# Patient Record
Sex: MALE | Race: BLACK OR AFRICAN AMERICAN
[De-identification: names, ages, dates, MRNs, and addresses within clinical notes are randomized per-mention and may not be internally consistent; named-entity substitution may affect disease eponyms.]

---

## 2021-07-21 ENCOUNTER — HOSPITAL ENCOUNTER (EMERGENCY)
Dept: HOSPITAL 82 - ED | Age: 41
LOS: 1 days | DRG: 101 | End: 2021-07-22
Payer: COMMERCIAL

## 2021-07-21 VITALS — WEIGHT: 240.3 LBS | BODY MASS INDEX: 34.4 KG/M2 | HEIGHT: 70 IN

## 2021-07-21 DIAGNOSIS — G40.909: Primary | ICD-10-CM

## 2021-07-21 DIAGNOSIS — Z91.128: ICD-10-CM

## 2021-07-21 DIAGNOSIS — T42.76XA: ICD-10-CM

## 2021-07-21 LAB
ALBUMIN SERPL-MCNC: 4.8 G/DL (ref 3.2–5)
ALP SERPL-CCNC: 79 U/L (ref 38–126)
ANION GAP SERPL CALCULATED.3IONS-SCNC: 21 MMOL/L
AST SERPL-CCNC: 30 U/L (ref 17–59)
BASOPHILS NFR BLD AUTO: 0 % (ref 0–3)
BUN SERPL-MCNC: 19 MG/DL (ref 9–20)
BUN/CREAT SERPL: 12
CHLORIDE SERPL-SCNC: 101 MMOL/L (ref 95–108)
CO2 SERPL-SCNC: 19 MMOL/L (ref 22–30)
CREAT SERPL-MCNC: 1.6 MG/DL (ref 0.7–1.3)
EOSINOPHIL NFR BLD AUTO: 0 % (ref 0–8)
ERYTHROCYTE [DISTWIDTH] IN BLOOD BY AUTOMATED COUNT: 11.9 % (ref 11.5–15.5)
HCT VFR BLD AUTO: 47.7 % (ref 39–50)
HGB BLD-MCNC: 15.6 G/DL (ref 14–18)
IMM GRANULOCYTES NFR BLD: 0.3 % (ref 0–5)
LYMPHOCYTES NFR BLD: 30 % (ref 15–41)
MCH RBC QN AUTO: 31.8 PG  CALC (ref 26–32)
MCHC RBC AUTO-ENTMCNC: 32.7 G/DL CAL (ref 32–36)
MCV RBC AUTO: 97.1 FL  CALC (ref 80–100)
MONOCYTES NFR BLD AUTO: 12 % (ref 2–13)
NEUTROPHILS # BLD AUTO: 4.38 THOU/UL (ref 1.82–7.42)
NEUTROPHILS NFR BLD AUTO: 57 % (ref 42–76)
PLATELET # BLD AUTO: 290 THOU/UL (ref 130–400)
POTASSIUM SERPL-SCNC: 3.6 MMOL/L (ref 3.5–5.1)
PROT SERPL-MCNC: 9.3 G/DL (ref 6.3–8.2)
RBC # BLD AUTO: 4.91 MILL/UL (ref 4.7–6.1)
SODIUM SERPL-SCNC: 137 MMOL/L (ref 137–146)

## 2021-07-22 VITALS — DIASTOLIC BLOOD PRESSURE: 85 MMHG | SYSTOLIC BLOOD PRESSURE: 130 MMHG

## 2022-04-19 ENCOUNTER — HOSPITAL ENCOUNTER (INPATIENT)
Dept: HOSPITAL 82 - ED | Age: 42
LOS: 3 days | Discharge: ANOTHER HOSPITAL | DRG: 101 | End: 2022-04-22
Attending: INTERNAL MEDICINE | Admitting: INTERNAL MEDICINE
Payer: COMMERCIAL

## 2022-04-19 VITALS — BODY MASS INDEX: 31.05 KG/M2 | WEIGHT: 221.79 LBS | HEIGHT: 71 IN

## 2022-04-19 VITALS — SYSTOLIC BLOOD PRESSURE: 125 MMHG | DIASTOLIC BLOOD PRESSURE: 69 MMHG

## 2022-04-19 VITALS — DIASTOLIC BLOOD PRESSURE: 83 MMHG | SYSTOLIC BLOOD PRESSURE: 135 MMHG

## 2022-04-19 DIAGNOSIS — Z20.822: ICD-10-CM

## 2022-04-19 DIAGNOSIS — G40.409: Primary | ICD-10-CM

## 2022-04-19 LAB
ALBUMIN SERPL-MCNC: 4.5 G/DL (ref 3.2–5)
ALP SERPL-CCNC: 77 U/L (ref 38–126)
ANION GAP SERPL CALCULATED.3IONS-SCNC: 14 MMOL/L
AST SERPL-CCNC: 29 U/L (ref 17–59)
BASOPHILS NFR BLD AUTO: 1 % (ref 0–3)
BILIRUB UR QL STRIP.AUTO: NEGATIVE
BUN SERPL-MCNC: 13 MG/DL (ref 9–20)
BUN/CREAT SERPL: 12
CHLORIDE SERPL-SCNC: 107 MMOL/L (ref 95–108)
CO2 SERPL-SCNC: 22 MMOL/L (ref 22–30)
COLOR UR AUTO: YELLOW
CREAT SERPL-MCNC: 1.1 MG/DL (ref 0.7–1.3)
EOSINOPHIL NFR BLD AUTO: 1 % (ref 0–8)
ERYTHROCYTE [DISTWIDTH] IN BLOOD BY AUTOMATED COUNT: 11.6 % (ref 11.5–15.5)
ETHANOL SERPL-MCNC: 0 MG/DL (ref 0–30)
GLUCOSE UR STRIP.AUTO-MCNC: NEGATIVE MG/DL
HCT VFR BLD AUTO: 42.9 % (ref 39–50)
HGB BLD-MCNC: 14.4 G/DL (ref 14–18)
HGB UR QL STRIP.AUTO: NEGATIVE
IMM GRANULOCYTES NFR BLD: 0.2 % (ref 0–5)
KETONES UR STRIP.AUTO-MCNC: 15 MG/DL
LEUKOCYTE ESTERASE UR QL STRIP.AUTO: NEGATIVE
LIPASE SERPL-CCNC: 34 U/L (ref 23–300)
LYMPHOCYTES NFR BLD: 32 % (ref 15–41)
MCH RBC QN AUTO: 31.8 PG  CALC (ref 26–32)
MCHC RBC AUTO-ENTMCNC: 33.6 G/DL CAL (ref 32–36)
MCV RBC AUTO: 94.7 FL  CALC (ref 80–100)
MONOCYTES NFR BLD AUTO: 12 % (ref 2–13)
NEUTROPHILS # BLD AUTO: 2.32 THOU/UL (ref 1.82–7.42)
NEUTROPHILS NFR BLD AUTO: 55 % (ref 42–76)
NITRITE UR QL STRIP.AUTO: NEGATIVE
PH UR STRIP.AUTO: 6 [PH] (ref 4.5–8)
PLATELET # BLD AUTO: 284 THOU/UL (ref 130–400)
POTASSIUM SERPL-SCNC: 3.8 MMOL/L (ref 3.5–5.1)
PROT SERPL-MCNC: 8.6 G/DL (ref 6.3–8.2)
PROT UR QL STRIP.AUTO: NEGATIVE MG/DL
RBC # BLD AUTO: 4.53 MILL/UL (ref 4.7–6.1)
SODIUM SERPL-SCNC: 140 MMOL/L (ref 137–146)
SP GR UR STRIP.AUTO: >=1.03
UROBILINOGEN UR QL STRIP.AUTO: 0.2 E.U./DL

## 2022-04-19 PROCEDURE — A9579 GAD-BASE MR CONTRAST NOS,1ML: HCPCS

## 2022-04-19 NOTE — NUR
PATIENT RESTING IN BED. ALERT AND ORIENTED. ABLE TO MAKE NEEDS KNOWN. TWO
GUARDS IN ROOM AT BEDSIDE. SHACKLES TO BILATERAL ANKLES PRESENT TO FOOT OF
BED. GOOD CIRCULATION PRESENT. ASSESSMENT COMPLETE. NO SIGNS OF DISTRESS
NOTED. NO COMPLAINTS OF PAIN. NO SIGNS OF SEIZURE ACTIVITY OBSERVED. BED RAILS
REMAIN PADDED FOR PATIENT SAFETY. CALL BELL IN REACH. BED IN LOW POSITION.

## 2022-04-19 NOTE — NUR
RECEIVE REPORT FROM ER NURSE SAVANA. PATIENT ALERT AND ORIENTED X3. NO DEFICIT
AT THIS TIME. PATIENT IS EDUCATES ABOUT MEDICATIONS, ADMISSION AND NURSING
PLAN. PATIENT REFER UNDERSTAND.

## 2022-04-19 NOTE — NUR
PT IN STRETCHER WITH SEIZURE PADS ON RAILS. IN NO DISTRESS AT THIS TIME. PT IS
DROWSY BUT AROUSABLE. VITALS STABLE.

## 2022-04-19 NOTE — NUR
FEMALE GUARD STATED, ''I FORGOT TO MENTION TO THE OTHER NURSE AT CHANGE OF
SHIFT THAT HE WAS HARD TO WAKE UP AND HIS EYES LOOKED ROLLED''. WHEN I
ASSESSED PATIENT ON MY ASSESSMENT BEGINNING OF MY SHIFT, I OBSERVED NO SIGNS
OF SEIZURE ACTIVITY AND PATIENT HAD NO COMPLAINTS. WILL RELAY TO DAY SHIFT
NURSE AS SHE WILL BE BACK IN AM.

## 2022-04-19 NOTE — NUR
SHACKLED CHECKED AS WELL TO PATIENTS ANKLES AND ADJUSTED TO KEEP SKIN FREE
FROM SORES AND IRRITATION.

## 2022-04-20 VITALS — DIASTOLIC BLOOD PRESSURE: 88 MMHG | SYSTOLIC BLOOD PRESSURE: 142 MMHG

## 2022-04-20 VITALS — DIASTOLIC BLOOD PRESSURE: 56 MMHG | SYSTOLIC BLOOD PRESSURE: 103 MMHG

## 2022-04-20 VITALS — SYSTOLIC BLOOD PRESSURE: 140 MMHG | DIASTOLIC BLOOD PRESSURE: 79 MMHG

## 2022-04-20 VITALS — SYSTOLIC BLOOD PRESSURE: 108 MMHG | DIASTOLIC BLOOD PRESSURE: 70 MMHG

## 2022-04-20 VITALS — SYSTOLIC BLOOD PRESSURE: 118 MMHG | DIASTOLIC BLOOD PRESSURE: 75 MMHG

## 2022-04-20 VITALS — DIASTOLIC BLOOD PRESSURE: 59 MMHG | SYSTOLIC BLOOD PRESSURE: 111 MMHG

## 2022-04-20 VITALS — DIASTOLIC BLOOD PRESSURE: 95 MMHG | SYSTOLIC BLOOD PRESSURE: 130 MMHG

## 2022-04-20 VITALS — SYSTOLIC BLOOD PRESSURE: 114 MMHG | DIASTOLIC BLOOD PRESSURE: 72 MMHG

## 2022-04-20 VITALS — SYSTOLIC BLOOD PRESSURE: 118 MMHG | DIASTOLIC BLOOD PRESSURE: 74 MMHG

## 2022-04-20 VITALS — SYSTOLIC BLOOD PRESSURE: 123 MMHG | DIASTOLIC BLOOD PRESSURE: 70 MMHG

## 2022-04-20 VITALS — DIASTOLIC BLOOD PRESSURE: 56 MMHG | SYSTOLIC BLOOD PRESSURE: 92 MMHG

## 2022-04-20 VITALS — DIASTOLIC BLOOD PRESSURE: 60 MMHG | SYSTOLIC BLOOD PRESSURE: 102 MMHG

## 2022-04-20 VITALS — SYSTOLIC BLOOD PRESSURE: 113 MMHG | DIASTOLIC BLOOD PRESSURE: 75 MMHG

## 2022-04-20 VITALS — SYSTOLIC BLOOD PRESSURE: 120 MMHG | DIASTOLIC BLOOD PRESSURE: 76 MMHG

## 2022-04-20 VITALS — DIASTOLIC BLOOD PRESSURE: 72 MMHG | SYSTOLIC BLOOD PRESSURE: 123 MMHG

## 2022-04-20 VITALS — SYSTOLIC BLOOD PRESSURE: 112 MMHG | DIASTOLIC BLOOD PRESSURE: 79 MMHG

## 2022-04-20 VITALS — SYSTOLIC BLOOD PRESSURE: 114 MMHG | DIASTOLIC BLOOD PRESSURE: 81 MMHG

## 2022-04-20 VITALS — DIASTOLIC BLOOD PRESSURE: 57 MMHG | SYSTOLIC BLOOD PRESSURE: 94 MMHG

## 2022-04-20 VITALS — SYSTOLIC BLOOD PRESSURE: 117 MMHG | DIASTOLIC BLOOD PRESSURE: 85 MMHG

## 2022-04-20 VITALS — SYSTOLIC BLOOD PRESSURE: 120 MMHG | DIASTOLIC BLOOD PRESSURE: 77 MMHG

## 2022-04-20 VITALS — SYSTOLIC BLOOD PRESSURE: 109 MMHG | DIASTOLIC BLOOD PRESSURE: 76 MMHG

## 2022-04-20 VITALS — SYSTOLIC BLOOD PRESSURE: 115 MMHG | DIASTOLIC BLOOD PRESSURE: 73 MMHG

## 2022-04-20 VITALS — DIASTOLIC BLOOD PRESSURE: 57 MMHG | SYSTOLIC BLOOD PRESSURE: 98 MMHG

## 2022-04-20 VITALS — DIASTOLIC BLOOD PRESSURE: 85 MMHG | SYSTOLIC BLOOD PRESSURE: 118 MMHG

## 2022-04-20 VITALS — SYSTOLIC BLOOD PRESSURE: 132 MMHG | DIASTOLIC BLOOD PRESSURE: 81 MMHG

## 2022-04-20 VITALS — SYSTOLIC BLOOD PRESSURE: 99 MMHG | DIASTOLIC BLOOD PRESSURE: 61 MMHG

## 2022-04-20 VITALS — SYSTOLIC BLOOD PRESSURE: 125 MMHG | DIASTOLIC BLOOD PRESSURE: 74 MMHG

## 2022-04-20 VITALS — DIASTOLIC BLOOD PRESSURE: 81 MMHG | SYSTOLIC BLOOD PRESSURE: 118 MMHG

## 2022-04-20 VITALS — SYSTOLIC BLOOD PRESSURE: 130 MMHG | DIASTOLIC BLOOD PRESSURE: 89 MMHG

## 2022-04-20 VITALS — SYSTOLIC BLOOD PRESSURE: 141 MMHG | DIASTOLIC BLOOD PRESSURE: 77 MMHG

## 2022-04-20 VITALS — DIASTOLIC BLOOD PRESSURE: 61 MMHG | SYSTOLIC BLOOD PRESSURE: 111 MMHG

## 2022-04-20 VITALS — DIASTOLIC BLOOD PRESSURE: 52 MMHG | SYSTOLIC BLOOD PRESSURE: 93 MMHG

## 2022-04-20 VITALS — SYSTOLIC BLOOD PRESSURE: 95 MMHG | DIASTOLIC BLOOD PRESSURE: 58 MMHG

## 2022-04-20 VITALS — SYSTOLIC BLOOD PRESSURE: 122 MMHG | DIASTOLIC BLOOD PRESSURE: 78 MMHG

## 2022-04-20 VITALS — DIASTOLIC BLOOD PRESSURE: 79 MMHG | SYSTOLIC BLOOD PRESSURE: 124 MMHG

## 2022-04-20 VITALS — SYSTOLIC BLOOD PRESSURE: 149 MMHG | DIASTOLIC BLOOD PRESSURE: 96 MMHG

## 2022-04-20 VITALS — DIASTOLIC BLOOD PRESSURE: 59 MMHG | SYSTOLIC BLOOD PRESSURE: 118 MMHG

## 2022-04-20 VITALS — SYSTOLIC BLOOD PRESSURE: 119 MMHG | DIASTOLIC BLOOD PRESSURE: 77 MMHG

## 2022-04-20 VITALS — DIASTOLIC BLOOD PRESSURE: 80 MMHG | SYSTOLIC BLOOD PRESSURE: 110 MMHG

## 2022-04-20 VITALS — SYSTOLIC BLOOD PRESSURE: 119 MMHG | DIASTOLIC BLOOD PRESSURE: 85 MMHG

## 2022-04-20 VITALS — SYSTOLIC BLOOD PRESSURE: 111 MMHG | DIASTOLIC BLOOD PRESSURE: 76 MMHG

## 2022-04-20 VITALS — SYSTOLIC BLOOD PRESSURE: 113 MMHG | DIASTOLIC BLOOD PRESSURE: 73 MMHG

## 2022-04-20 VITALS — SYSTOLIC BLOOD PRESSURE: 131 MMHG | DIASTOLIC BLOOD PRESSURE: 82 MMHG

## 2022-04-20 VITALS — DIASTOLIC BLOOD PRESSURE: 74 MMHG | SYSTOLIC BLOOD PRESSURE: 108 MMHG

## 2022-04-20 VITALS — SYSTOLIC BLOOD PRESSURE: 121 MMHG | DIASTOLIC BLOOD PRESSURE: 78 MMHG

## 2022-04-20 VITALS — SYSTOLIC BLOOD PRESSURE: 127 MMHG | DIASTOLIC BLOOD PRESSURE: 74 MMHG

## 2022-04-20 VITALS — DIASTOLIC BLOOD PRESSURE: 75 MMHG | SYSTOLIC BLOOD PRESSURE: 114 MMHG

## 2022-04-20 VITALS — DIASTOLIC BLOOD PRESSURE: 77 MMHG | SYSTOLIC BLOOD PRESSURE: 112 MMHG

## 2022-04-20 VITALS — SYSTOLIC BLOOD PRESSURE: 120 MMHG | DIASTOLIC BLOOD PRESSURE: 85 MMHG

## 2022-04-20 VITALS — SYSTOLIC BLOOD PRESSURE: 103 MMHG | DIASTOLIC BLOOD PRESSURE: 57 MMHG

## 2022-04-20 VITALS — DIASTOLIC BLOOD PRESSURE: 73 MMHG | SYSTOLIC BLOOD PRESSURE: 106 MMHG

## 2022-04-20 VITALS — DIASTOLIC BLOOD PRESSURE: 56 MMHG | SYSTOLIC BLOOD PRESSURE: 112 MMHG

## 2022-04-20 VITALS — SYSTOLIC BLOOD PRESSURE: 118 MMHG | DIASTOLIC BLOOD PRESSURE: 76 MMHG

## 2022-04-20 VITALS — SYSTOLIC BLOOD PRESSURE: 125 MMHG | DIASTOLIC BLOOD PRESSURE: 84 MMHG

## 2022-04-20 VITALS — SYSTOLIC BLOOD PRESSURE: 142 MMHG | DIASTOLIC BLOOD PRESSURE: 87 MMHG

## 2022-04-20 VITALS — SYSTOLIC BLOOD PRESSURE: 122 MMHG | DIASTOLIC BLOOD PRESSURE: 71 MMHG

## 2022-04-20 VITALS — DIASTOLIC BLOOD PRESSURE: 73 MMHG | SYSTOLIC BLOOD PRESSURE: 115 MMHG

## 2022-04-20 VITALS — DIASTOLIC BLOOD PRESSURE: 62 MMHG | SYSTOLIC BLOOD PRESSURE: 100 MMHG

## 2022-04-20 LAB
ALBUMIN SERPL-MCNC: 3.5 G/DL (ref 3.2–5)
ALP SERPL-CCNC: 70 U/L (ref 38–126)
ANION GAP SERPL CALCULATED.3IONS-SCNC: 11 MMOL/L
AST SERPL-CCNC: 20 U/L (ref 17–59)
BUN SERPL-MCNC: 13 MG/DL (ref 9–20)
BUN/CREAT SERPL: 12
CHLORIDE SERPL-SCNC: 108 MMOL/L (ref 95–108)
CO2 SERPL-SCNC: 25 MMOL/L (ref 22–30)
CREAT SERPL-MCNC: 1.1 MG/DL (ref 0.7–1.3)
ERYTHROCYTE [DISTWIDTH] IN BLOOD BY AUTOMATED COUNT: 11.7 % (ref 11.5–15.5)
HCT VFR BLD AUTO: 40.8 % (ref 39–50)
HGB BLD-MCNC: 13.5 G/DL (ref 14–18)
MAGNESIUM SERPL-MCNC: 2 MG/DL (ref 1.6–2.3)
MCH RBC QN AUTO: 32.1 PG  CALC (ref 26–32)
MCHC RBC AUTO-ENTMCNC: 33.1 G/DL CAL (ref 32–36)
MCV RBC AUTO: 96.9 FL  CALC (ref 80–100)
PLATELET # BLD AUTO: 271 THOU/UL (ref 130–400)
POTASSIUM SERPL-SCNC: 4.1 MMOL/L (ref 3.5–5.1)
PROT SERPL-MCNC: 6.7 G/DL (ref 6.3–8.2)
RBC # BLD AUTO: 4.21 MILL/UL (ref 4.7–6.1)
SODIUM SERPL-SCNC: 139 MMOL/L (ref 137–146)

## 2022-04-20 NOTE — NUR
PT BROUGHT TO ICU BED 1 AFTER RAPID RESPONSE TEAM CALLED TO ROOM WHERE PT WAS
HAVING A SEIZURE.  ACCORDING TO MED SURG NURSE, PT HAS A HX OF SEIZURES AND
HAD BEEN HAVING THEM ALL DAY YESTERDAY.  SEIZURE PADS IN PLACE , FLUIDS
INFUSING WITH KEPPRA DRIP,  PT HAD BEEN GIVEN 4 MG OF ATIVAN IV BEFORE
TRANSFERRED TO ICU.  UPON ARRIVAL TO ICU WITH 2 GUARDS, PTS VITAL SIGNS
STABLE, NO ACTIVE SEIZURES AT THIS TIME,

## 2022-04-20 NOTE — NUR
NO BAG OF IV FLUIDS HUNG. NO COMPLAINTS VOICED BY PATIENT. NO DISTRESS NOTED.
TWO GUARDS REMAIN AT BEDSIDE IN ROOM. SHACKLED CHECKED TO BILATERAL ANKLE.
GOOD CICRCULATION TO BILATERAL ANKLES. CALL LIGHT REMAINS IN REACH.

## 2022-04-20 NOTE — NUR
mri of head recommended and will follow up as consult tomorrow. pt ate approx
100% of dinner tray,  has not had any seizure activity since arrival on icu
unit,  alert/oriented x3

## 2022-04-20 NOTE — NUR
CNA CHANGED PATIENTS SOCKS TO PUT LONGER SOCKS ON PATIENT TO HELP PROTECT SKIN
FROM SHACKLES TO HELP REDUCE SKIN FROM BREAKING DOWN. CIRCULATION APPEARS
ADEQUATE. PATIENT DID COMPLAIN OF A HEADACHE. PRN TYLENOL PROVIDED. NO SIGNS
OF SEIZURE ACTIVITY NOTED. CALL BELL IN REACH. TWO GUARDS REMAIN IN ROOM WITH
PATIENT. SHACKLES REMAIN TO BILATERAL ANKLES.

## 2022-04-20 NOTE — NUR
DILANTIN INFUSING PER ORDER.  PT WAKING UP WHILE TALKING TO GUARDS. RESPONDING
WELL FOR RIGHT AFTER SEDATION.  VITAL SIGNS STABLE.  SPOKE WITH NURSE AT
FACILITY PT COMES FROM AND GAVE HER AN UPDATE OF WHAT WAS HAPPENING WITH PT.
ADVISED WILL NOTIFY OF ANY CHANGES.

## 2022-04-20 NOTE — NUR
2 NEW GUARDS JUST ARRIVED TO SWITCH OUT WITH THE 2 CURRENT GUARDS THAT ARE IN
THE ROOM CURRENTLY WITH THE PATIENT.

## 2022-04-20 NOTE — NUR
PT STILL REMAINS IN DROWSY SEDATED STATES FROM 4 MG OF ATIVAN. WILL WAIT PER
TELENEUROLOGIST REPORT FOR SEVERAL HOURS TIL ATIVAN BEGINS WEARING OFF TO
OBSERVE IF HE RETURNS TO NORMAL STATES BEFORE SEIZURE.  PT WILL WITHDRAW FROM
PAIN, WILL OPEN EYES WITH VERBAL.  GUARDS REMAIN AT BEDSIDE, AND VITAL SIGNS
REMAIN STABLE

## 2022-04-20 NOTE — NUR
teleneurology on [place] cart 2,  pt states he has less sensation on lower left
leg since yesterday.  states doesnt think he remembers telling anyone that
since arrival

## 2022-04-20 NOTE — NUR
PATIENT RESTING IN BED. SHACKLES REMAIN TO BILATERAL ANKLES. GOOD CIRCULATION.
SKIN INTACT. NO COMPLAINTS OF PAIN. NO SEIZURE ACTIVITY NOTED. CALL LIGHT IN
REACH. TWO GUARDS REMAIN IN ROOM WITH PATIENT.

## 2022-04-20 NOTE — NUR
PATIENT RESTING IN BED. NO COMPLAINTS VOICED AT THIS TIME. PATIENT VERY
PLEASANT. NO SEIZURE ACTIVITY OBSERVED. GUARDS X2 REMAIN IN ROOM. SHACKLES
CHECKED TO BILATERAL ANKLES. SKIN CHECKED AS WELL. CIRCULATION GOOD.

## 2022-04-20 NOTE — NUR
PT AWAKE AND WATCHING TV, HAS URINATED APPROX 1200 OUT.  ALERT/ORIENTED, NO
SEIZURE ACTIVITY NOTED.  VITAL SIGNS STABLE.

## 2022-04-20 NOTE — NUR
NEW BAG OF IV FLUIDS HUNG. NO COMPLAINTS VOICED BY PATIENT. NO DISTRESS NOTED.
TWO GUARDS REMAIN AT BEDSIDE IN ROOM. SHACKLES CHECKED TO BILATERAL ANKLES.
GOOD CIRCULATION TO BILATERAL ANKLES. CALL LIGHT REMAINS IN REACH.

## 2022-04-20 NOTE — NUR
GUARD MET NURSE IN HALLWAY CLOSE TO PT'S ROOM AND INFORMED PT WAS HAVING
SEIZURE, ON PROMPT ASSESSMENT PT IN SUPINE POSITION IN BED WITH HEAD TURNED TO
LEFT SIDE, TONGUE HANGING OUT OF MOUTH AND EYES CLOSED.
HE DID NOT RESPOND TO VERBAL OR TACTILE STIMULI AND NURSE SENT STAFF TO CALL
RAPID RESPONSE STAT WHILE SHE STAYED WITH PT. TEAM ARRIVED IN SECONDS AND
BEGAN TREATMENT, PT WAS THEN TRANSFERRED TO ICU BED-1 AFTER HE STARTED TO
RESPOND TO TREATMENT. PT REMAINS IN ICU FOR CLOSER MONITORING.

## 2022-04-20 NOTE — NUR
ADVISED PT THAT WE WOULD GIVE HIM A MEAL TRAY AFTER HE HAD COMPLETELY WOKEN UP
SO WE KNOW THAT HE CAN SWALLOW THE FOOD WITHOUT CHOKING.  PT AWAKENS AND TALKS
, USING URINAL, THEN WILL FALL IMMEDIATELY BACK TO SLEEP.  VITAL SIGNS STABLE.
 GUARDS REMAIN AT BEDSIDE, WITH FOOT SHACKLED.

## 2022-04-20 NOTE — NUR
TELENEUROLOGIST DID CONSULT VIA CART 2,  SUGGESTED AN MRI AN EEG IF PT DOES
NOT RETURN TO NORMAL STATUS IN A FEW HOURS.

## 2022-04-21 VITALS — SYSTOLIC BLOOD PRESSURE: 88 MMHG | DIASTOLIC BLOOD PRESSURE: 57 MMHG

## 2022-04-21 VITALS — SYSTOLIC BLOOD PRESSURE: 118 MMHG | DIASTOLIC BLOOD PRESSURE: 72 MMHG

## 2022-04-21 VITALS — SYSTOLIC BLOOD PRESSURE: 105 MMHG | DIASTOLIC BLOOD PRESSURE: 64 MMHG

## 2022-04-21 VITALS — DIASTOLIC BLOOD PRESSURE: 48 MMHG | SYSTOLIC BLOOD PRESSURE: 103 MMHG

## 2022-04-21 VITALS — SYSTOLIC BLOOD PRESSURE: 118 MMHG | DIASTOLIC BLOOD PRESSURE: 67 MMHG

## 2022-04-21 VITALS — SYSTOLIC BLOOD PRESSURE: 105 MMHG | DIASTOLIC BLOOD PRESSURE: 60 MMHG

## 2022-04-21 VITALS — SYSTOLIC BLOOD PRESSURE: 102 MMHG | DIASTOLIC BLOOD PRESSURE: 57 MMHG

## 2022-04-21 VITALS — SYSTOLIC BLOOD PRESSURE: 117 MMHG | DIASTOLIC BLOOD PRESSURE: 83 MMHG

## 2022-04-21 VITALS — DIASTOLIC BLOOD PRESSURE: 60 MMHG | SYSTOLIC BLOOD PRESSURE: 129 MMHG

## 2022-04-21 VITALS — DIASTOLIC BLOOD PRESSURE: 71 MMHG | SYSTOLIC BLOOD PRESSURE: 120 MMHG

## 2022-04-21 VITALS — SYSTOLIC BLOOD PRESSURE: 102 MMHG | DIASTOLIC BLOOD PRESSURE: 62 MMHG

## 2022-04-21 VITALS — DIASTOLIC BLOOD PRESSURE: 70 MMHG | SYSTOLIC BLOOD PRESSURE: 106 MMHG

## 2022-04-21 VITALS — DIASTOLIC BLOOD PRESSURE: 60 MMHG | SYSTOLIC BLOOD PRESSURE: 111 MMHG

## 2022-04-21 VITALS — DIASTOLIC BLOOD PRESSURE: 58 MMHG | SYSTOLIC BLOOD PRESSURE: 129 MMHG

## 2022-04-21 VITALS — SYSTOLIC BLOOD PRESSURE: 87 MMHG | DIASTOLIC BLOOD PRESSURE: 67 MMHG

## 2022-04-21 VITALS — SYSTOLIC BLOOD PRESSURE: 103 MMHG | DIASTOLIC BLOOD PRESSURE: 63 MMHG

## 2022-04-21 VITALS — DIASTOLIC BLOOD PRESSURE: 69 MMHG | SYSTOLIC BLOOD PRESSURE: 118 MMHG

## 2022-04-21 VITALS — SYSTOLIC BLOOD PRESSURE: 117 MMHG | DIASTOLIC BLOOD PRESSURE: 71 MMHG

## 2022-04-21 VITALS — DIASTOLIC BLOOD PRESSURE: 61 MMHG | SYSTOLIC BLOOD PRESSURE: 108 MMHG

## 2022-04-21 VITALS — SYSTOLIC BLOOD PRESSURE: 99 MMHG | DIASTOLIC BLOOD PRESSURE: 52 MMHG

## 2022-04-21 VITALS — DIASTOLIC BLOOD PRESSURE: 47 MMHG | SYSTOLIC BLOOD PRESSURE: 114 MMHG

## 2022-04-21 VITALS — SYSTOLIC BLOOD PRESSURE: 109 MMHG | DIASTOLIC BLOOD PRESSURE: 58 MMHG

## 2022-04-21 VITALS — DIASTOLIC BLOOD PRESSURE: 73 MMHG | SYSTOLIC BLOOD PRESSURE: 114 MMHG

## 2022-04-21 VITALS — DIASTOLIC BLOOD PRESSURE: 49 MMHG | SYSTOLIC BLOOD PRESSURE: 91 MMHG

## 2022-04-21 VITALS — DIASTOLIC BLOOD PRESSURE: 71 MMHG | SYSTOLIC BLOOD PRESSURE: 109 MMHG

## 2022-04-21 VITALS — DIASTOLIC BLOOD PRESSURE: 76 MMHG | SYSTOLIC BLOOD PRESSURE: 115 MMHG

## 2022-04-21 VITALS — DIASTOLIC BLOOD PRESSURE: 74 MMHG | SYSTOLIC BLOOD PRESSURE: 111 MMHG

## 2022-04-21 VITALS — DIASTOLIC BLOOD PRESSURE: 77 MMHG | SYSTOLIC BLOOD PRESSURE: 128 MMHG

## 2022-04-21 VITALS — DIASTOLIC BLOOD PRESSURE: 74 MMHG | SYSTOLIC BLOOD PRESSURE: 119 MMHG

## 2022-04-21 VITALS — DIASTOLIC BLOOD PRESSURE: 67 MMHG | SYSTOLIC BLOOD PRESSURE: 111 MMHG

## 2022-04-21 VITALS — SYSTOLIC BLOOD PRESSURE: 130 MMHG | DIASTOLIC BLOOD PRESSURE: 74 MMHG

## 2022-04-21 VITALS — SYSTOLIC BLOOD PRESSURE: 132 MMHG | DIASTOLIC BLOOD PRESSURE: 75 MMHG

## 2022-04-21 VITALS — DIASTOLIC BLOOD PRESSURE: 74 MMHG | SYSTOLIC BLOOD PRESSURE: 121 MMHG

## 2022-04-21 VITALS — DIASTOLIC BLOOD PRESSURE: 72 MMHG | SYSTOLIC BLOOD PRESSURE: 121 MMHG

## 2022-04-21 VITALS — DIASTOLIC BLOOD PRESSURE: 68 MMHG | SYSTOLIC BLOOD PRESSURE: 113 MMHG

## 2022-04-21 VITALS — DIASTOLIC BLOOD PRESSURE: 70 MMHG | SYSTOLIC BLOOD PRESSURE: 112 MMHG

## 2022-04-21 VITALS — DIASTOLIC BLOOD PRESSURE: 84 MMHG | SYSTOLIC BLOOD PRESSURE: 117 MMHG

## 2022-04-21 VITALS — DIASTOLIC BLOOD PRESSURE: 51 MMHG | SYSTOLIC BLOOD PRESSURE: 98 MMHG

## 2022-04-21 VITALS — DIASTOLIC BLOOD PRESSURE: 70 MMHG | SYSTOLIC BLOOD PRESSURE: 115 MMHG

## 2022-04-21 VITALS — SYSTOLIC BLOOD PRESSURE: 108 MMHG | DIASTOLIC BLOOD PRESSURE: 78 MMHG

## 2022-04-21 VITALS — DIASTOLIC BLOOD PRESSURE: 57 MMHG | SYSTOLIC BLOOD PRESSURE: 76 MMHG

## 2022-04-21 VITALS — SYSTOLIC BLOOD PRESSURE: 122 MMHG | DIASTOLIC BLOOD PRESSURE: 72 MMHG

## 2022-04-21 VITALS — SYSTOLIC BLOOD PRESSURE: 88 MMHG | DIASTOLIC BLOOD PRESSURE: 55 MMHG

## 2022-04-21 VITALS — SYSTOLIC BLOOD PRESSURE: 114 MMHG | DIASTOLIC BLOOD PRESSURE: 71 MMHG

## 2022-04-21 VITALS — DIASTOLIC BLOOD PRESSURE: 87 MMHG | SYSTOLIC BLOOD PRESSURE: 124 MMHG

## 2022-04-21 VITALS — SYSTOLIC BLOOD PRESSURE: 109 MMHG | DIASTOLIC BLOOD PRESSURE: 51 MMHG

## 2022-04-21 VITALS — DIASTOLIC BLOOD PRESSURE: 60 MMHG | SYSTOLIC BLOOD PRESSURE: 95 MMHG

## 2022-04-21 VITALS — SYSTOLIC BLOOD PRESSURE: 116 MMHG | DIASTOLIC BLOOD PRESSURE: 75 MMHG

## 2022-04-21 VITALS — SYSTOLIC BLOOD PRESSURE: 91 MMHG | DIASTOLIC BLOOD PRESSURE: 48 MMHG

## 2022-04-21 VITALS — DIASTOLIC BLOOD PRESSURE: 81 MMHG | SYSTOLIC BLOOD PRESSURE: 122 MMHG

## 2022-04-21 VITALS — SYSTOLIC BLOOD PRESSURE: 130 MMHG | DIASTOLIC BLOOD PRESSURE: 73 MMHG

## 2022-04-21 VITALS — SYSTOLIC BLOOD PRESSURE: 138 MMHG | DIASTOLIC BLOOD PRESSURE: 90 MMHG

## 2022-04-21 LAB
ANION GAP SERPL CALCULATED.3IONS-SCNC: 11 MMOL/L
BUN SERPL-MCNC: 11 MG/DL (ref 9–20)
BUN/CREAT SERPL: 9
CHLORIDE SERPL-SCNC: 108 MMOL/L (ref 95–108)
CO2 SERPL-SCNC: 26 MMOL/L (ref 22–30)
CREAT SERPL-MCNC: 1.1 MG/DL (ref 0.7–1.3)
ERYTHROCYTE [DISTWIDTH] IN BLOOD BY AUTOMATED COUNT: 11.8 % (ref 11.5–15.5)
HCT VFR BLD AUTO: 39.7 % (ref 39–50)
HGB BLD-MCNC: 13.1 G/DL (ref 14–18)
MAGNESIUM SERPL-MCNC: 1.8 MG/DL (ref 1.6–2.3)
MCH RBC QN AUTO: 32.3 PG  CALC (ref 26–32)
MCHC RBC AUTO-ENTMCNC: 33 G/DL CAL (ref 32–36)
MCV RBC AUTO: 98 FL  CALC (ref 80–100)
PHENYTOIN (DILANTIN): 11 UG/ML (ref 10–20)
PLATELET # BLD AUTO: 255 THOU/UL (ref 130–400)
POTASSIUM SERPL-SCNC: 3.9 MMOL/L (ref 3.5–5.1)
RBC # BLD AUTO: 4.05 MILL/UL (ref 4.7–6.1)
SODIUM SERPL-SCNC: 141 MMOL/L (ref 137–146)

## 2022-04-21 NOTE — NUR
pt placed  in w/c in preparation for transfer to mri.  walked with steady gait
from bed to w/c, moniter placed beside pt in chair with blanket and mask in
place.

## 2022-04-21 NOTE — NUR
pt sitting up in bed eating lunch, no seizure activity at this time.  states
feels fine, maew, vital signs stable.  talking and laughing with staff.  nihs
score a 0 at this time.

## 2022-04-21 NOTE — NUR
MRI CALLED AND ASKED ABOUT ATIVAN STATUS.  WILL TRY AND TALK PT THRU BUT IS
HAVING A HARD TIME LAYING STILL

## 2022-04-21 NOTE — NUR
NO NEW SEIZURE ACTIVITY SINCE THIS AM EPISODE.  VITAL SIGNS STABLE.  GUARDS
REMAIN AT BEDSIDE. MAEW, CALL LIGHT WITHIN REACH.  NO COMPLAINTS AT THIS TIME

## 2022-04-21 NOTE — NUR
PT RECEIVED 532 ML OF JEVITY NUTRITION  VIA PEG TUBE. PRCODEURE WAS TOLERATED
WELL. PT IS RESTING QUIETLY POSITIONED ON RIGHT SIDE WITH PILLOWS ON AN AIR
MATRESS. MARIN IS SECURE AND  FLOWING TO GRAVITY. WILL CONTINUE TO MONITOR.

## 2022-04-21 NOTE — NUR
guard notified staff that pt was having a small seizure,  pt was shaking in
upper part of body with rigidity, not alert at this time.  ativan 1 mg given
per order.  within 1 min seizure has finished.  remains drowsy, no
incontinence, vital signs stable.

## 2022-04-21 NOTE — NUR
pt gave himself a bed bath, linens change,pt walked to bathroom with steady
gait  smiling and talking with staff  no complaits

## 2022-04-21 NOTE — NUR
waiting for neurologist to come on [place] cart 2 to speak with pt.  pt resting
quietly on bed, no complaints at this time, will continue to moniter and
reassess pt.

## 2022-04-21 NOTE — NUR
DR. DUMONT NOTIFIED OF RECOMMENDATIONS OF DR. WOMACK THE TELENEUROLOGIST.
CHANGE ORDER  OF KEPPRA TO 1500 MG BID,  CHANGE ORDER OF DILANTIN TID 
MG AT NIGHT TIME,  CHANGE ATIVAN 1 MG IV PRN FOR SEIZURE ONLY FROM ATIVAN TID.
DILANTIN LEVEL IN AM

## 2022-04-21 NOTE — NUR
PT REPORT RECEIVED FROM NIGHT SHIFT.  NO CHANGE THRU OUT THE NIGHT, VITAL
SIGNS STABLE,  NO SEIZURE ACTIVITY,  PT MAEW. GUARDS REMAIN AT BEDDSIDE.

## 2022-04-21 NOTE — NUR
pt eating late breakfast due to mri at this time.  no seizure activity today,
pt up to bathroom, walking on own.

## 2022-04-22 VITALS — SYSTOLIC BLOOD PRESSURE: 128 MMHG | DIASTOLIC BLOOD PRESSURE: 76 MMHG

## 2022-04-22 VITALS — DIASTOLIC BLOOD PRESSURE: 51 MMHG | SYSTOLIC BLOOD PRESSURE: 105 MMHG

## 2022-04-22 VITALS — DIASTOLIC BLOOD PRESSURE: 76 MMHG | SYSTOLIC BLOOD PRESSURE: 126 MMHG

## 2022-04-22 VITALS — DIASTOLIC BLOOD PRESSURE: 33 MMHG | SYSTOLIC BLOOD PRESSURE: 69 MMHG

## 2022-04-22 VITALS — DIASTOLIC BLOOD PRESSURE: 64 MMHG | SYSTOLIC BLOOD PRESSURE: 119 MMHG

## 2022-04-22 VITALS — SYSTOLIC BLOOD PRESSURE: 65 MMHG | DIASTOLIC BLOOD PRESSURE: 32 MMHG

## 2022-04-22 VITALS — DIASTOLIC BLOOD PRESSURE: 87 MMHG | SYSTOLIC BLOOD PRESSURE: 122 MMHG

## 2022-04-22 VITALS — SYSTOLIC BLOOD PRESSURE: 78 MMHG | DIASTOLIC BLOOD PRESSURE: 42 MMHG

## 2022-04-22 VITALS — SYSTOLIC BLOOD PRESSURE: 106 MMHG | DIASTOLIC BLOOD PRESSURE: 76 MMHG

## 2022-04-22 VITALS — SYSTOLIC BLOOD PRESSURE: 113 MMHG | DIASTOLIC BLOOD PRESSURE: 78 MMHG

## 2022-04-22 VITALS — SYSTOLIC BLOOD PRESSURE: 120 MMHG | DIASTOLIC BLOOD PRESSURE: 75 MMHG

## 2022-04-22 VITALS — DIASTOLIC BLOOD PRESSURE: 62 MMHG | SYSTOLIC BLOOD PRESSURE: 121 MMHG

## 2022-04-22 VITALS — DIASTOLIC BLOOD PRESSURE: 50 MMHG | SYSTOLIC BLOOD PRESSURE: 95 MMHG

## 2022-04-22 VITALS — DIASTOLIC BLOOD PRESSURE: 33 MMHG | SYSTOLIC BLOOD PRESSURE: 68 MMHG

## 2022-04-22 VITALS — SYSTOLIC BLOOD PRESSURE: 100 MMHG | DIASTOLIC BLOOD PRESSURE: 68 MMHG

## 2022-04-22 VITALS — SYSTOLIC BLOOD PRESSURE: 118 MMHG | DIASTOLIC BLOOD PRESSURE: 85 MMHG

## 2022-04-22 VITALS — SYSTOLIC BLOOD PRESSURE: 121 MMHG | DIASTOLIC BLOOD PRESSURE: 74 MMHG

## 2022-04-22 VITALS — DIASTOLIC BLOOD PRESSURE: 80 MMHG | SYSTOLIC BLOOD PRESSURE: 113 MMHG

## 2022-04-22 VITALS — SYSTOLIC BLOOD PRESSURE: 99 MMHG | DIASTOLIC BLOOD PRESSURE: 62 MMHG

## 2022-04-22 VITALS — DIASTOLIC BLOOD PRESSURE: 63 MMHG | SYSTOLIC BLOOD PRESSURE: 103 MMHG

## 2022-04-22 VITALS — SYSTOLIC BLOOD PRESSURE: 112 MMHG | DIASTOLIC BLOOD PRESSURE: 67 MMHG

## 2022-04-22 VITALS — SYSTOLIC BLOOD PRESSURE: 101 MMHG | DIASTOLIC BLOOD PRESSURE: 64 MMHG

## 2022-04-22 VITALS — DIASTOLIC BLOOD PRESSURE: 66 MMHG | SYSTOLIC BLOOD PRESSURE: 114 MMHG

## 2022-04-22 VITALS — SYSTOLIC BLOOD PRESSURE: 112 MMHG | DIASTOLIC BLOOD PRESSURE: 80 MMHG

## 2022-04-22 VITALS — DIASTOLIC BLOOD PRESSURE: 64 MMHG | SYSTOLIC BLOOD PRESSURE: 103 MMHG

## 2022-04-22 VITALS — DIASTOLIC BLOOD PRESSURE: 60 MMHG | SYSTOLIC BLOOD PRESSURE: 102 MMHG

## 2022-04-22 VITALS — DIASTOLIC BLOOD PRESSURE: 71 MMHG | SYSTOLIC BLOOD PRESSURE: 104 MMHG

## 2022-04-22 VITALS — DIASTOLIC BLOOD PRESSURE: 61 MMHG | SYSTOLIC BLOOD PRESSURE: 110 MMHG

## 2022-04-22 VITALS — DIASTOLIC BLOOD PRESSURE: 69 MMHG | SYSTOLIC BLOOD PRESSURE: 108 MMHG

## 2022-04-22 VITALS — DIASTOLIC BLOOD PRESSURE: 64 MMHG | SYSTOLIC BLOOD PRESSURE: 110 MMHG

## 2022-04-22 VITALS — SYSTOLIC BLOOD PRESSURE: 107 MMHG | DIASTOLIC BLOOD PRESSURE: 60 MMHG

## 2022-04-22 VITALS — DIASTOLIC BLOOD PRESSURE: 78 MMHG | SYSTOLIC BLOOD PRESSURE: 116 MMHG

## 2022-04-22 VITALS — SYSTOLIC BLOOD PRESSURE: 114 MMHG | DIASTOLIC BLOOD PRESSURE: 79 MMHG

## 2022-04-22 LAB
ANION GAP SERPL CALCULATED.3IONS-SCNC: 12 MMOL/L
BUN SERPL-MCNC: 10 MG/DL (ref 9–20)
BUN/CREAT SERPL: 8
CHLORIDE SERPL-SCNC: 107 MMOL/L (ref 95–108)
CO2 SERPL-SCNC: 26 MMOL/L (ref 22–30)
CREAT SERPL-MCNC: 1.2 MG/DL (ref 0.7–1.3)
ERYTHROCYTE [DISTWIDTH] IN BLOOD BY AUTOMATED COUNT: 11.8 % (ref 11.5–15.5)
HCT VFR BLD AUTO: 41 % (ref 39–50)
HGB BLD-MCNC: 13.5 G/DL (ref 14–18)
MAGNESIUM SERPL-MCNC: 1.9 MG/DL (ref 1.6–2.3)
MCH RBC QN AUTO: 32.4 PG  CALC (ref 26–32)
MCHC RBC AUTO-ENTMCNC: 32.9 G/DL CAL (ref 32–36)
MCV RBC AUTO: 98.3 FL  CALC (ref 80–100)
PLATELET # BLD AUTO: 260 THOU/UL (ref 130–400)
POTASSIUM SERPL-SCNC: 4 MMOL/L (ref 3.5–5.1)
RBC # BLD AUTO: 4.17 MILL/UL (ref 4.7–6.1)
SODIUM SERPL-SCNC: 141 MMOL/L (ref 137–146)

## 2022-04-22 NOTE — NUR
call placed to [facility] transfer center; information provided to Linda regarding
transfer; facesheet and covid results have been faxed; awaiting return call

## 2022-04-22 NOTE — NUR
awake in bed eating dinner; no apparent distress noted; pt offers no
complaints; iv intact; guards x2 at bedside; pt remains cuffed to bed; call
light within reach; will continue to monitor

## 2022-04-22 NOTE — NUR
pt awake in bed; no apparent distress noted; pt additional seizure activity
since this am; iv intact; guards x2 at bedside; sr on monitor; transfer
explained/consent obtained; DOC staff informed of ETA; will continue to
monitor

## 2022-04-22 NOTE — NUR
pt awake in bed conversing with [facility] staff; no apparent distress noted; iv
intact and patent; pt alert and oriented; does not recall having a seizure; pt
admits to having headaches, tasting neelam and feeling hot prior to seizures;
pt states hx of seizures since 14-15 years old; am meds explained and
administered; plan to transfer explained; will continue to monitor

## 2022-04-22 NOTE — NUR
call placed to [facility]; information provided; ETA 30
minutes

## 2022-04-22 NOTE — NUR
pt resting in bed with eyes closed; guards x2 at bedside; iv intact; sr on
monitor; call light within reach; will continue to monitor

## 2022-04-22 NOTE — NUR
[facility] transfer center called; spoke with Linda; pt accepted just awaiting bed
assignment

## 2022-04-22 NOTE — NUR
awake in bed; no apparent distress noted; pt offers no complaints; nutrigrain
bars and soda provided as per request; iv intact; sr on monitor; guards x2 at
bedside; call light within reach; will continue to monitor

## 2022-04-22 NOTE — NUR
pt resting in bed with eyes closed; no apparent distress noted; correctional
officers x2 at bedside; pt easily arousable; admits to headache rating 8/10;
no n/v noted; pt denies any vision problems; will medicate; resp even and
unlabored; lungs clear; skin color wnl; ra; hr reg; strong pulses; no edema
noted; sr on monitor; abd soft with bs present; no bm noted per writer; pt
admits to bm last night; no urine to inspect at this time; pt admits to
urinating without complication; #20 flushed and patent to lh; no redness or
edema noted at site; plan of care/am meds explained; pt cuffed bilat le; pt
requesting for "ultraound to check his kidneys "from the meds I'm on""; labs
reviewed with pt; pt informed ultrasound of kidneys can be performed on outpt
basis at the facility; no seizure activity noted; call light within reach;
will continue to monitor

## 2022-04-22 NOTE — NUR
call received from Linda at [facility] transfer center; bed assignment received;
pt to transfer to neuro unit [id number]; report to be called to transfer line;
accepting MD Dr OLIVIA Solis

## 2022-04-22 NOTE — NUR
pt noted with active seizure; mild tremors noted to lower extremities;
posturing to upper extremities; body rigid; airway patent; seizure last approx
35-40 seconds; no urinary incont noted; pt postictal; will contiune to monitor

## 2022-04-22 NOTE — NUR
Dr Escalera present at bedside to assess pt and discuss plan of care; pt remains
postictal at this time

## 2022-08-22 ENCOUNTER — HOSPITAL ENCOUNTER (EMERGENCY)
Dept: HOSPITAL 82 - ED | Age: 42
Discharge: ANOTHER HOSPITAL | DRG: 101 | End: 2022-08-22
Payer: COMMERCIAL

## 2022-08-22 VITALS — DIASTOLIC BLOOD PRESSURE: 85 MMHG | SYSTOLIC BLOOD PRESSURE: 125 MMHG

## 2022-08-22 VITALS — DIASTOLIC BLOOD PRESSURE: 75 MMHG | SYSTOLIC BLOOD PRESSURE: 105 MMHG

## 2022-08-22 VITALS — SYSTOLIC BLOOD PRESSURE: 120 MMHG | DIASTOLIC BLOOD PRESSURE: 77 MMHG

## 2022-08-22 VITALS — DIASTOLIC BLOOD PRESSURE: 78 MMHG | SYSTOLIC BLOOD PRESSURE: 112 MMHG

## 2022-08-22 VITALS — DIASTOLIC BLOOD PRESSURE: 93 MMHG | SYSTOLIC BLOOD PRESSURE: 122 MMHG

## 2022-08-22 VITALS — WEIGHT: 249.12 LBS | BODY MASS INDEX: 34.88 KG/M2 | HEIGHT: 71 IN

## 2022-08-22 VITALS — SYSTOLIC BLOOD PRESSURE: 119 MMHG | DIASTOLIC BLOOD PRESSURE: 85 MMHG

## 2022-08-22 VITALS — SYSTOLIC BLOOD PRESSURE: 126 MMHG | DIASTOLIC BLOOD PRESSURE: 88 MMHG

## 2022-08-22 VITALS — SYSTOLIC BLOOD PRESSURE: 122 MMHG | DIASTOLIC BLOOD PRESSURE: 93 MMHG

## 2022-08-22 VITALS — DIASTOLIC BLOOD PRESSURE: 76 MMHG | SYSTOLIC BLOOD PRESSURE: 114 MMHG

## 2022-08-22 DIAGNOSIS — Z20.822: ICD-10-CM

## 2022-08-22 DIAGNOSIS — G40.901: Primary | ICD-10-CM

## 2022-08-22 LAB
ALBUMIN SERPL-MCNC: 4.4 G/DL (ref 3.2–5)
ALP SERPL-CCNC: 68 U/L (ref 38–126)
ANION GAP SERPL CALCULATED.3IONS-SCNC: 13 MMOL/L
AST SERPL-CCNC: 30 U/L (ref 17–59)
BASOPHILS NFR BLD AUTO: 1 % (ref 0–3)
BUN SERPL-MCNC: 14 MG/DL (ref 9–20)
BUN/CREAT SERPL: 12
CHLORIDE SERPL-SCNC: 107 MMOL/L (ref 95–108)
CO2 SERPL-SCNC: 22 MMOL/L (ref 22–30)
CREAT SERPL-MCNC: 1.1 MG/DL (ref 0.7–1.3)
EOSINOPHIL NFR BLD AUTO: 1 % (ref 0–8)
ERYTHROCYTE [DISTWIDTH] IN BLOOD BY AUTOMATED COUNT: 11.8 % (ref 11.5–15.5)
HCT VFR BLD AUTO: 42.2 % (ref 39–50)
HGB BLD-MCNC: 14.2 G/DL (ref 14–18)
IMM GRANULOCYTES NFR BLD: 0.3 % (ref 0–5)
LYMPHOCYTES NFR BLD: 33 % (ref 15–41)
MCH RBC QN AUTO: 31.9 PG  CALC (ref 26–32)
MCHC RBC AUTO-ENTMCNC: 33.6 G/DL CAL (ref 32–36)
MCV RBC AUTO: 94.8 FL  CALC (ref 80–100)
MONOCYTES NFR BLD AUTO: 13 % (ref 2–13)
NEUTROPHILS # BLD AUTO: 1.98 THOU/UL (ref 1.82–7.42)
NEUTROPHILS NFR BLD AUTO: 53 % (ref 42–76)
PLATELET # BLD AUTO: 231 THOU/UL (ref 130–400)
POTASSIUM SERPL-SCNC: 3.9 MMOL/L (ref 3.5–5.1)
PROT SERPL-MCNC: 8.3 G/DL (ref 6.3–8.2)
RBC # BLD AUTO: 4.45 MILL/UL (ref 4.7–6.1)
SODIUM SERPL-SCNC: 138 MMOL/L (ref 137–146)